# Patient Record
Sex: MALE | Race: WHITE | NOT HISPANIC OR LATINO | Employment: FULL TIME | URBAN - NONMETROPOLITAN AREA
[De-identification: names, ages, dates, MRNs, and addresses within clinical notes are randomized per-mention and may not be internally consistent; named-entity substitution may affect disease eponyms.]

---

## 2017-01-13 ENCOUNTER — OFFICE VISIT (OUTPATIENT)
Dept: RETAIL CLINIC | Facility: CLINIC | Age: 62
End: 2017-01-13

## 2017-01-13 VITALS
RESPIRATION RATE: 18 BRPM | DIASTOLIC BLOOD PRESSURE: 74 MMHG | WEIGHT: 222.6 LBS | TEMPERATURE: 98.7 F | SYSTOLIC BLOOD PRESSURE: 120 MMHG | HEART RATE: 77 BPM | OXYGEN SATURATION: 97 %

## 2017-01-13 DIAGNOSIS — J06.9 ACUTE URI: Primary | ICD-10-CM

## 2017-01-13 PROCEDURE — 99213 OFFICE O/P EST LOW 20 MIN: CPT | Performed by: NURSE PRACTITIONER

## 2017-01-13 RX ORDER — BROMPHENIRAMINE MALEATE, PSEUDOEPHEDRINE HYDROCHLORIDE, AND DEXTROMETHORPHAN HYDROBROMIDE 2; 30; 10 MG/5ML; MG/5ML; MG/5ML
5 SYRUP ORAL EVERY 6 HOURS PRN
Qty: 100 ML | Refills: 0 | Status: SHIPPED | OUTPATIENT
Start: 2017-01-13 | End: 2017-01-18

## 2017-01-13 NOTE — MR AVS SNAPSHOT
Chucky Garcia   1/13/2017 9:00 AM   Office Visit    Dept Phone:  [phone]   Encounter #:  [id number]    Provider:  Provider [facility] [place]   Department:  [facility]                Your Full Care Plan              Today's Medication Changes          These changes are accurate as of: 1/13/17  9:03 AM.  If you have any questions, ask your nurse or doctor.               New Medication(s)Ordered:     brompheniramine-pseudoephedrine-DM 30-2-10 MG/5ML syrup   Take 5 mL by mouth Every 6 (Six) Hours As Needed for congestion or cough for up to 5 days.            Where to Get Your Medications      These medications were sent to [facility] [address] [place] [address] - [phone] [facility] [phone] [address] [address]     Phone:  [phone]     brompheniramine-pseudoephedrine-DM 30-2-10 MG/5ML syrup                  Your Updated Medication List          This list is accurate as of: 1/13/17  9:03 AM.  Always use your most recent med list.                aspirin 81 MG EC tablet       azithromycin 250 MG tablet   Commonly known as:  ZITHROMAX Z-BOB   Take 2 tablets the first day, then 1 tablet daily for 4 days.       BENAZEPRIL HCL PO       brompheniramine-pseudoephedrine-DM 30-2-10 MG/5ML syrup   Take 5 mL by mouth Every 6 (Six) Hours As Needed for congestion or cough for up to 5 days.       FISH OIL PO       LIPITOR PO       MULTIVITAMIN ADULT PO               You Were Diagnosed With        Codes Comments    Acute URI    -  Primary ICD-10-CM: J06.9  ICD-9-CM: 465.9       Instructions    You have been diagnosed with an upper respiratory infection (URI) which is likely viral. Viruses are not killed by antibiotics and therefore an antibiotic is not prescribed for you today. A viral illness can last between 3 and 14 days, and symptoms may persist the entire course of illness. Symptomatic treatment is best.  Take Ibuprofen or Tylenol as needed for pain or fever.  A saline nasal spray may be used to help keep your nose clear from discharge. Be sure that you are increasing your intake of clear, decaffeinated fluids and get plenty of rest. If your symptoms worsen or persist, follow up with your primary care provider. Pt verbalized understanding of plan, visit summary given.    TOMAS Mohan         Patient Instructions History      Upcoming Appointments     Visit Type Date Time Department    OFFICE VISIT 2017  9:00 AM [facility] [place]      [employer] Signup     [facility] [employer] allows you to send messages to your doctor, view your test results, renew your prescriptions, schedule appointments, and more. To sign up, go to [employer] and click on the Sign Up Now link in the New User? box. Enter your [employer] Activation Code exactly as it appears below along with the last four digits of your Social Security Number and your Date of Birth () to complete the sign-up process. If you do not sign up before the expiration date, you must request a new code.    [employer] Activation Code: [email or web address]  Expires: 2017  9:03 AM    If you have questions, you can email [email or web address] or call [phone] to talk to our [employer] staff. Remember, [employer] is NOT to be used for urgent needs. For medical emergencies, dial 911.               Other Info from Your Visit           Allergies     Penicillins Allergy Swelling      Vital Signs     Smoking Status                   Former Smoker           Problems and Diagnoses Noted     Acute upper respiratory infection    -  Primary

## 2017-01-13 NOTE — PROGRESS NOTES
Subjective   Chucky Garcia is a 61 y.o. male.     HPI Comments: Patient reports a 2 day history of mild nasal congestion, cough, sore throat with sweats and chills last pm. Taking mucinex yesterday and today with no relief. Some chest congestion but slept well.     Sore Throat    Associated symptoms include congestion and coughing. Pertinent negatives include no ear pain, headaches or trouble swallowing.        Allergies   Allergen Reactions   • Penicillins Swelling         The following portions of the patient's history were reviewed and updated as appropriate: allergies, current medications, past family history, past medical history, past social history, past surgical history and problem list.    Review of Systems   Constitutional: Positive for chills and diaphoresis. Negative for fever.   HENT: Positive for congestion, postnasal drip and sore throat. Negative for ear pain, rhinorrhea, sinus pressure and trouble swallowing.    Respiratory: Positive for cough. Negative for wheezing.    Skin: Negative for rash.   Neurological: Negative for headaches.       Objective     Visit Vitals   • /74   • Pulse 77   • Temp 98.7 °F (37.1 °C) (Oral)   • Resp 18   • Wt 222 lb 9.6 oz (101 kg)   • SpO2 97%       Physical Exam  General Appearance:  Well-appearing, well-developed, well hydrated, well nourished, no acute distress, alert and oriented, appears stated age, comfortable, pleasant, cooperative  Head/face:  Normocephalic, atraumatic  Eyes:  PERRLA, EOMI, no conjunctivitis or icterus  Ears:  Bilateral TMs are dull gray with diffuse light reflex, canals are clear, no pinna or tragus tenderness with palpation  Nose/Sinuses:  Nares are mildly congested bilaterally  Mouth/Throat:  Posterior pharynx is mildly erythematous with a small amount of clear drainage  Neck:  Supple without lymphadenopathy or thyromegaly; trachea is midline  Lungs:  Clear to auscultation bilaterally  Heart:  Regular rate and rhythm without murmur,  gallop or rub  Neurologic:  Alert; no focal deficits; age appropriate behavior and speech      Assessment/Plan   Chucky was seen today for sore throat.    Diagnoses and all orders for this visit:    Acute URI    Other orders  -     brompheniramine-pseudoephedrine-DM 30-2-10 MG/5ML syrup; Take 5 mL by mouth Every 6 (Six) Hours As Needed for congestion or cough for up to 5 days.      Discussed dosing, side effects, recommended other symptomatic care.  Patient instructions and visit summary given as documented. Patient should follow up with primary care provider if symptoms worsen, fail to resolve or other symptoms need attention. Patient/family agree to the above.            TOMAS Mohan

## 2017-01-13 NOTE — PATIENT INSTRUCTIONS
You have been diagnosed with an upper respiratory infection (URI) which is likely viral. Viruses are not killed by antibiotics and therefore an antibiotic is not prescribed for you today. A viral illness can last between 3 and 14 days, and symptoms may persist the entire course of illness. Symptomatic treatment is best.  Take Ibuprofen or Tylenol as needed for pain or fever. A saline nasal spray may be used to help keep your nose clear from discharge. Be sure that you are increasing your intake of clear, decaffeinated fluids and get plenty of rest. If your symptoms worsen or persist, follow up with your primary care provider. Pt verbalized understanding of plan, visit summary given.    TOMAS Mohan

## 2017-04-01 ENCOUNTER — OFFICE VISIT (OUTPATIENT)
Dept: RETAIL CLINIC | Facility: CLINIC | Age: 62
End: 2017-04-01

## 2017-04-01 VITALS
HEART RATE: 106 BPM | SYSTOLIC BLOOD PRESSURE: 130 MMHG | TEMPERATURE: 100.8 F | HEIGHT: 67 IN | OXYGEN SATURATION: 98 % | RESPIRATION RATE: 20 BRPM | BODY MASS INDEX: 36.19 KG/M2 | WEIGHT: 230.6 LBS | DIASTOLIC BLOOD PRESSURE: 78 MMHG

## 2017-04-01 DIAGNOSIS — J10.1 INFLUENZA A: Primary | ICD-10-CM

## 2017-04-01 LAB
EXPIRATION DATE: NORMAL
EXPIRATION DATE: NORMAL
FLUAV AG NPH QL: POSITIVE
FLUBV AG NPH QL: NEGATIVE
INTERNAL CONTROL: NORMAL
INTERNAL CONTROL: NORMAL
Lab: NORMAL
Lab: NORMAL
S PYO AG THROAT QL: NEGATIVE

## 2017-04-01 PROCEDURE — 87880 STREP A ASSAY W/OPTIC: CPT | Performed by: NURSE PRACTITIONER

## 2017-04-01 PROCEDURE — 87804 INFLUENZA ASSAY W/OPTIC: CPT | Performed by: NURSE PRACTITIONER

## 2017-04-01 PROCEDURE — 99213 OFFICE O/P EST LOW 20 MIN: CPT | Performed by: NURSE PRACTITIONER

## 2017-04-01 RX ORDER — OSELTAMIVIR PHOSPHATE 75 MG/1
75 CAPSULE ORAL 2 TIMES DAILY
Qty: 10 CAPSULE | Refills: 0 | Status: SHIPPED | OUTPATIENT
Start: 2017-04-01 | End: 2017-04-06

## 2017-04-01 RX ORDER — IBUPROFEN 800 MG/1
800 TABLET ORAL EVERY 6 HOURS PRN
Qty: 30 TABLET | Refills: 0 | Status: SHIPPED | OUTPATIENT
Start: 2017-04-01

## 2017-04-01 RX ORDER — DEXTROMETHORPHAN HYDROBROMIDE AND PROMETHAZINE HYDROCHLORIDE 15; 6.25 MG/5ML; MG/5ML
5 SYRUP ORAL 4 TIMES DAILY PRN
Qty: 240 ML | Refills: 0 | Status: SHIPPED | OUTPATIENT
Start: 2017-04-01 | End: 2017-04-08

## 2017-04-01 NOTE — PROGRESS NOTES
Subjective   Chucky Garcia is a 61 y.o. male.   Chief Complaint   Patient presents with   • Sore Throat      Sore Throat    This is a new problem. The current episode started yesterday. The problem has been rapidly worsening. Neither side of throat is experiencing more pain than the other. The maximum temperature recorded prior to his arrival was 101 - 101.9 F. The fever has been present for less than 1 day. The pain is at a severity of 8/10. Associated symptoms include coughing and headaches. Pertinent negatives include no diarrhea or shortness of breath. Associated symptoms comments: Sore throat. He has tried acetaminophen for the symptoms. The treatment provided no relief.        The following portions of the patient's history were reviewed and updated as appropriate: allergies, current medications, past family history, past medical history, past social history, past surgical history and problem list.    Current Outpatient Prescriptions:   •  aspirin 81 MG EC tablet, Take 81 mg by mouth Daily., Disp: , Rfl:   •  Atorvastatin Calcium (LIPITOR PO), Take  by mouth., Disp: , Rfl:   •  BENAZEPRIL HCL PO, Take  by mouth., Disp: , Rfl:   •  Multiple Vitamins-Minerals (MULTIVITAMIN ADULT PO), Take  by mouth., Disp: , Rfl:   •  Omega-3 Fatty Acids (FISH OIL PO), Take  by mouth., Disp: , Rfl:   •  azithromycin (ZITHROMAX Z-BOB) 250 MG tablet, Take 2 tablets the first day, then 1 tablet daily for 4 days., Disp: 6 tablet, Rfl: 0  •  ibuprofen (ADVIL,MOTRIN) 800 MG tablet, Take 1 tablet by mouth Every 6 (Six) Hours As Needed for Moderate Pain (4-6) or Fever., Disp: 30 tablet, Rfl: 0  •  oseltamivir (TAMIFLU) 75 MG capsule, Take 1 capsule by mouth 2 (Two) Times a Day for 5 days., Disp: 10 capsule, Rfl: 0  •  promethazine-dextromethorphan (PROMETHAZINE-DM) 6.25-15 MG/5ML syrup, Take 5 mL by mouth 4 (Four) Times a Day As Needed for Cough for up to 7 days., Disp: 240 mL, Rfl: 0    Review of Systems   Constitutional: Positive for  "activity change, appetite change and fever.   HENT: Positive for rhinorrhea and sore throat.    Eyes: Negative for redness.   Respiratory: Positive for cough. Negative for chest tightness, shortness of breath and wheezing.    Gastrointestinal: Negative for diarrhea and nausea.   Skin: Negative for rash.   Allergic/Immunologic: Negative for immunocompromised state.   Neurological: Positive for headaches.   Hematological: Negative for adenopathy.     /78 (BP Location: Left arm, Patient Position: Sitting)  Pulse 106  Temp (!) 100.8 °F (38.2 °C) (Temporal Artery )   Resp 20  Ht 67\" (170.2 cm)  Wt 230 lb 9.6 oz (105 kg)  SpO2 98%  BMI 36.12 kg/m2    Objective   Allergies   Allergen Reactions   • Penicillins Swelling       Physical Exam   Constitutional: He is oriented to person, place, and time. He appears well-developed and well-nourished. He has a sickly appearance. No distress.   HENT:   Head: Normocephalic.   Right Ear: External ear normal.   Left Ear: External ear normal.   Nose: Nose normal.   Mouth/Throat: Mucous membranes are normal. Posterior oropharyngeal erythema present. Tonsils are 0 on the right. Tonsils are 0 on the left. No tonsillar exudate.   Eyes: Conjunctivae are normal.   Neck: Normal range of motion. Neck supple. No tracheal deviation present. No thyromegaly present.   Cardiovascular: Normal rate and regular rhythm.    Pulmonary/Chest: Effort normal and breath sounds normal.   Musculoskeletal: Normal range of motion.   Lymphadenopathy:     He has no cervical adenopathy.   Neurological: He is alert and oriented to person, place, and time.   Skin: Skin is warm and dry.   Psychiatric: He has a normal mood and affect. His behavior is normal.   Vitals reviewed.      Assessment/Plan   Chucky was seen today for sore throat.    Diagnoses and all orders for this visit:    Influenza A  -     POC Influenza A / B  -     POC Rapid Strep A    Other orders  -     ibuprofen (ADVIL,MOTRIN) 800 MG tablet; " Take 1 tablet by mouth Every 6 (Six) Hours As Needed for Moderate Pain (4-6) or Fever.  -     oseltamivir (TAMIFLU) 75 MG capsule; Take 1 capsule by mouth 2 (Two) Times a Day for 5 days.  -     promethazine-dextromethorphan (PROMETHAZINE-DM) 6.25-15 MG/5ML syrup; Take 5 mL by mouth 4 (Four) Times a Day As Needed for Cough for up to 7 days.      Lab Results   Component Value Date    RAPFLUA Positive 04/01/2017    RAPFLUB Negative 04/01/2017      Lab Results   Component Value Date    RAPSCRN Negative 04/01/2017           An After Visit Summary was printed, reviewed, and given to the patient. Understanding verbalized and agrees with treatment plan.  If no improvement or becomes worse, follow up with primary or go to [facility].        April 1, 2017 3:24 PM

## 2017-04-01 NOTE — PATIENT INSTRUCTIONS
"Influenza, Adult  Influenza, more commonly known as \"the flu,\" is a viral infection that primarily affects the respiratory tract. The respiratory tract includes organs that help you breathe, such as the lungs, nose, and throat. The flu causes many common cold symptoms, as well as a high fever and body aches.  The flu spreads easily from person to person (is contagious). Getting a flu shot (influenza vaccination) every year is the best way to prevent influenza.  CAUSES  Influenza is caused by a virus. You can catch the virus by:  · Breathing in droplets from an infected person's cough or sneeze.  · Touching something that was recently contaminated with the virus and then touching your mouth, nose, or eyes.  RISK FACTORS  The following factors may make you more likely to get the flu:  · Not cleaning your hands frequently with soap and water or alcohol-based hand .  · Having close contact with many people during cold and flu season.  · Touching your mouth, eyes, or nose without washing or sanitizing your hands first.  · Not drinking enough fluids or not eating a healthy diet.  · Not getting enough sleep or exercise.  · Being under a high amount of stress.  · Not getting a yearly (annual) flu shot.  You may be at a higher risk of complications from the flu, such as a severe lung infection (pneumonia), if you:  · Are over the age of 65.  · Are pregnant.  · Have a weakened disease-fighting system (immune system). You may have a weakened immune system if you:    Have HIV or AIDS.    Are undergoing chemotherapy.    Are taking medicines that reduce the activity of (suppress) the immune system.  · Have a long-term (chronic) illness, such as heart disease, kidney disease, diabetes, or lung disease.  · Have a liver disorder.  · Are obese.  · Have anemia.  SYMPTOMS  Symptoms of this condition typically last 4-10 days and may include:  · Fever.  · Chills.  · Headache, body aches, or muscle aches.  · Sore " throat.  · Cough.  · Runny or congested nose.  · Chest discomfort and cough.  · Poor appetite.  · Weakness or tiredness (fatigue).  · Dizziness.  · Nausea or vomiting.  DIAGNOSIS  This condition may be diagnosed based on your medical history and a physical exam. Your health care provider may do a nose or throat swab test to confirm the diagnosis.  TREATMENT  If influenza is detected early, you can be treated with antiviral medicine that can reduce the length of your illness and the severity of your symptoms. This medicine may be given by mouth (orally) or through an IV tube that is inserted in one of your veins.  The goal of treatment is to relieve symptoms by taking care of yourself at home. This may include taking over-the-counter medicines, drinking plenty of fluids, and adding humidity to the air in your home.  In some cases, influenza goes away on its own. Severe influenza or complications from influenza may be treated in a hospital.  HOME CARE INSTRUCTIONS  · Take over-the-counter and prescription medicines only as told by your health care provider.  · Use a cool mist humidifier to add humidity to the air in your home. This can make breathing easier.  · Rest as needed.  · Drink enough fluid to keep your urine clear or pale yellow.  · Cover your mouth and nose when you cough or sneeze.  · Wash your hands with soap and water often, especially after you cough or sneeze. If soap and water are not available, use hand .  · Stay home from work or school as told by your health care provider. Unless you are visiting your health care provider, try to avoid leaving home until your fever has been gone for 24 hours without the use of medicine.  · Keep all follow-up visits as told by your health care provider. This is important.  PREVENTION  · Getting an annual flu shot is the best way to avoid getting the flu. You may get the flu shot in late summer, fall, or winter. Ask your health care provider when you should  get your flu shot.  · Wash your hands often or use hand  often.  · Avoid contact with people who are sick during cold and flu season.  · Eat a healthy diet, drink plenty of fluids, get enough sleep, and exercise regularly.  SEEK MEDICAL CARE IF:  · You develop new symptoms.  · You have:    Chest pain.    Diarrhea.    A fever.  · Your cough gets worse.  · You produce more mucus.  · You feel nauseous or you vomit.  SEEK IMMEDIATE MEDICAL CARE IF:  · You develop shortness of breath or difficulty breathing.  · Your skin or nails turn a bluish color.  · You have severe pain or stiffness in your neck.  · You develop a sudden headache or sudden pain in your face or ear.  · You cannot stop vomiting.     This information is not intended to replace advice given to you by your health care provider. Make sure you discuss any questions you have with your health care provider.     Document Released: 12/15/2001 Document Revised: 01/13/2017 Document Reviewed: 10/11/2016  "[employer]" Interactive Patient Education ©2016 [employer]